# Patient Record
Sex: MALE | Race: WHITE | Employment: OTHER | URBAN - METROPOLITAN AREA
[De-identification: names, ages, dates, MRNs, and addresses within clinical notes are randomized per-mention and may not be internally consistent; named-entity substitution may affect disease eponyms.]

---

## 2019-01-05 ENCOUNTER — HOSPITAL ENCOUNTER (EMERGENCY)
Age: 84
Discharge: HOME | End: 2019-01-05
Attending: EMERGENCY MEDICINE
Payer: MEDICARE

## 2019-01-05 ENCOUNTER — APPOINTMENT (OUTPATIENT)
Dept: GENERAL RADIOLOGY | Age: 84
End: 2019-01-05
Attending: EMERGENCY MEDICINE
Payer: MEDICARE

## 2019-01-05 VITALS
DIASTOLIC BLOOD PRESSURE: 76 MMHG | OXYGEN SATURATION: 96 % | SYSTOLIC BLOOD PRESSURE: 122 MMHG | HEART RATE: 80 BPM | TEMPERATURE: 97.9 F | RESPIRATION RATE: 18 BRPM

## 2019-01-05 DIAGNOSIS — L73.9 FOLLICULITIS: ICD-10-CM

## 2019-01-05 DIAGNOSIS — J20.9 ACUTE BRONCHITIS, UNSPECIFIED ORGANISM: Primary | ICD-10-CM

## 2019-01-05 PROCEDURE — 99284 EMERGENCY DEPT VISIT MOD MDM: CPT | Performed by: EMERGENCY MEDICINE

## 2019-01-05 PROCEDURE — 71046 X-RAY EXAM CHEST 2 VIEWS: CPT

## 2019-01-05 RX ORDER — AZITHROMYCIN 250 MG/1
TABLET, FILM COATED ORAL
Qty: 6 TAB | Refills: 0 | Status: SHIPPED | OUTPATIENT
Start: 2019-01-05

## 2019-01-06 NOTE — ED NOTES
Report called to [facility] ([facility]). Pt left ED via [facility] in stable condition with 1 script.

## 2019-01-06 NOTE — ED NOTES
Pt refuses to stay in stretcher. States there is nothing wrong with him. Redirected to wheelchair next to nurses' station.

## 2019-01-06 NOTE — ED TRIAGE NOTES
Pt presents from EMS from [facility] ([facility]) c/o cough x 3 days and rash to back of neck. Hx dementia and pt is deaf.

## 2019-01-06 NOTE — ED PROVIDER NOTES
77-year-old gentleman presents with concerns about a cough. Nursing home sent him over here for evaluation. Patient doesn't really seem to have any perspective about what's going on. On my arrival to my shif the patient was up walking around the hallway speaking very loudly in full sentences saying that there is nothing wrong and that he cannot hear well. I also noted a rash along his hairline on the back of his head Elements of this note were created using speech recognition software. As such, there may be errors of speech recognition present. Past Medical History:  
Diagnosis Date  Endocrine disease   
 hypothyroid  Hypertension  Psychiatric disorder   
 alheimizer, dementia History reviewed. No pertinent surgical history. History reviewed. No pertinent family history. Social History Socioeconomic History  Marital status:  Spouse name: Not on file  Number of children: Not on file  Years of education: Not on file  Highest education level: Not on file Social Needs  Financial resource strain: Not on file  Food insecurity - worry: Not on file  Food insecurity - inability: Not on file  Transportation needs - medical: Not on file  Transportation needs - non-medical: Not on file Occupational History  Not on file Tobacco Use  Smoking status: Former Smoker Substance and Sexual Activity  Alcohol use: No  
 Drug use: No  
 Sexual activity: No  
Other Topics Concern  Not on file Social History Narrative  Not on file ALLERGIES: Chocolate [cocoa]; Penicillins; and Sulfa (sulfonamide antibiotics) Review of Systems Unable to perform ROS: Dementia Vitals:  
 01/05/19 2249 01/05/19 2250 BP: 122/76 Pulse:  80 Resp:  18 Temp:  97.9 °F (36.6 °C) SpO2:  96% Physical Exam  
Constitutional: He appears well-developed and well-nourished. HENT:  
Head: Normocephalic and atraumatic. Neurological: He is alert. Pleasantly confused Skin:  
Patient has some papules along the hairline at the back of his neck. It seems most consistent with folliculitis. I do not think it represents shingles. Nursing note and vitals reviewed. MDM Number of Diagnoses or Management Options Acute bronchitis, unspecified organism:  
Diagnosis management comments: Patient allergic to penicillin and sulfa. I will treat his apparent bronchitis with azithromycin. This may also help his folliculitis. Procedures